# Patient Record
Sex: FEMALE | Race: WHITE
[De-identification: names, ages, dates, MRNs, and addresses within clinical notes are randomized per-mention and may not be internally consistent; named-entity substitution may affect disease eponyms.]

---

## 2018-06-11 ENCOUNTER — HOSPITAL ENCOUNTER (EMERGENCY)
Dept: HOSPITAL 44 - ED | Age: 53
Discharge: HOME | End: 2018-06-11
Payer: COMMERCIAL

## 2018-06-11 VITALS — SYSTOLIC BLOOD PRESSURE: 122 MMHG | DIASTOLIC BLOOD PRESSURE: 74 MMHG

## 2018-06-11 DIAGNOSIS — N13.2: Primary | ICD-10-CM

## 2018-06-11 LAB
AMORPHOUS SEDIMENT,UR: (no result)
APPEARANCE UR: (no result)
BASOPHILS NFR BLD: 0.7 % (ref 0–1.5)
COLOR,URINE: YELLOW
EGFR (NON-AFRICAN): > 60
EOSINOPHIL NFR BLD: 5.4 % (ref 0–6.8)
MCH RBC QN AUTO: 28.9 PG (ref 28–34)
MCV RBC AUTO: 89.7 FL (ref 80–100)
MONOCYTES %: 4.8 % (ref 0–11)
NEUTROPHILS #: 4.2 # K/UL (ref 1.4–7.7)
PH UR STRIP: 7 [PH] (ref 5–8)
RBC UR QL: (no result)
UROBILINOGEN URINE: 0.2 EU (ref 0.2–1)

## 2018-06-11 PROCEDURE — 80053 COMPREHEN METABOLIC PANEL: CPT

## 2018-06-11 PROCEDURE — 99285 EMERGENCY DEPT VISIT HI MDM: CPT

## 2018-06-11 PROCEDURE — 81002 URINALYSIS NONAUTO W/O SCOPE: CPT

## 2018-06-11 PROCEDURE — 74176 CT ABD & PELVIS W/O CONTRAST: CPT

## 2018-06-11 PROCEDURE — 96375 TX/PRO/DX INJ NEW DRUG ADDON: CPT

## 2018-06-11 PROCEDURE — S1016 NON-PVC INTRAVENOUS ADMINIST: HCPCS

## 2018-06-11 PROCEDURE — 96365 THER/PROPH/DIAG IV INF INIT: CPT

## 2018-06-11 PROCEDURE — 85025 COMPLETE CBC W/AUTO DIFF WBC: CPT

## 2018-06-11 RX ADMIN — ONDANSETRON ONE MG: 2 INJECTION INTRAMUSCULAR; INTRAVENOUS at 18:00

## 2018-06-11 RX ADMIN — KETOROLAC TROMETHAMINE ONE: 30 INJECTION, SOLUTION INTRAMUSCULAR at 18:11

## 2018-06-11 RX ADMIN — RETINOL, ERGOCALCIFEROL, .ALPHA.-TOCOPHEROL ACETATE, DL-, PHYTONADIONE, ASCORBIC ACID, NIACINAMIDE, RIBOFLAVIN 5-PHOSPHATE SODIUM, THIAMINE HYDROCHLORIDE, PYRIDOXINE HYDROCHLORIDE, DEXPANTHENOL, BIOTIN, FOLIC ACID, AND CYANOCOBALAMIN ONE MLS/HR: KIT at 18:03

## 2018-06-11 RX ADMIN — KETOROLAC TROMETHAMINE ONE MG: 30 INJECTION, SOLUTION INTRAMUSCULAR at 18:19

## 2018-06-11 NOTE — ED PHYSICIAN DOCUMENTATION
Female Urogenital Problems





- HISTORIAN


Historian: patient





- HPI


Stated Complaint: right abd/right flank pain


Chief Complaint: Female Urogenital Problems


Further Comments: yes (53 year old female patient presents with complaint of 

right sided abdominal pain that radiates to right groin. Pt stated pain started 

approximately 90 minutes PTA. Pt denies injury. Complains of nausea.)





- Vaginal Bleeding


Contraceptive: none





- Associated Symptoms


Urinary Symptoms: discomfort w/ urination, pain w/ urination





- ROS


CONST: none


GI/: denies: nausea, vomiting, decreased appetite, diarrhea, black stools, 

bloody stools, other


CVS/RESP: none


EYES/ENT: none


NEURO/PSYCH: none


MS/SKIN/LYMPH: none





- PAST HX


Past History: denies: none


Other History: other (anxiety, chronic pain, fibromyalgia, HTN, migraine, OA, 

neuropathy)


Allergies/Adverse Reactions: 


 Allergies











Allergy/AdvReac Type Severity Reaction Status Date / Time


 


No Known Allergies Allergy   Verified 06/11/18 17:57














Home Medications: 


 Ambulatory Orders











 Medication  Instructions  Recorded


 


Ketorolac Tromethamine [Toradol] 10 mg PO TID #15 tablet 06/11/18


 


Tamsulosin HCl [Flomax] 0.4 mg PO [id number] #7 cap.er.24h 06/11/18














- SOCIAL HX


Smoking History: cigarettes





- FAMILY HX


Family History: denies: none





- VITAL SIGNS


Vital Signs: 





 Vital Signs











Temp Pulse Resp BP Pulse Ox


 


 97.8 F   74   16   162/84   99 


 


 06/11/18 17:30  06/11/18 17:30  06/11/18 17:30  06/11/18 17:30  06/11/18 17:30














- REVIEWED ASSESSMENTS


Nursing Assessment  Reviewed: Yes


Vitals Reviewed: Yes





Progress





- Progress


Progress: 





Pain improved after Fluids and toradol.  





Reviewed CT results with patient; pain likely related to passed stone or 2mm 

stone at [facility]. 





Will start on Flomax; reviewed discharge instructions - verbalized 

understanding. 





ED Results Lab/Radiology





- Lab Results


Lab Results: 





 Lab Results











  06/11/18 06/11/18 06/11/18





  18:15 18:15 17:52


 


WBC    5.99 K/ul K/ul  





    (4.00-12.00)  


 


RBC    4.48 M/ul M/ul  





    (3.90-5.20)  


 


Hgb    12.9 g/dL g/dL  





    (12.0-16.0)  


 


Hct    40.2 % %  





    (34.5-46.5)  


 


MCV    89.7 fl fl  





    (80.0-100.0)  


 


MCH    28.9 pg pg  





    (28.0-34.0)  


 


MCHC    32.2 g/dL g/dL  





    (30.0-36.0)  


 


RDW    13.4 % %  





    (11.3-14.3)  


 


Plt Count    219 K/mm3 K/mm3  





    (130-400)  


 


Neut % (Auto)    70.5 % %  





    (39.0-79.0)  


 


Lymph % (Auto)    17.3 % %  





    (16.0-50.0)  


 


Mono % (Auto)    4.8 % %  





    (0.0-11.0)  


 


Eos % (Auto)    5.4 % %  





    (0.0-6.8)  


 


Baso % (Auto)    0.7   





    (0.0-1.5)  


 


Neut # (Auto)    4.2 # k/uL # k/uL  





    (1.4-7.7)  


 


Lymph # (Auto)    1.0 # k/uL # k/uL  





    (0.6-4.0)  


 


Mono # (Auto)    0.3 # k/uL # k/uL  





    (0.0-0.9)  


 


Eos # (Auto)    0.3 # k/uL # k/uL  





    (0.0-0.6)  


 


Baso # (Auto)    0.0 # k/uL # k/uL  





    (0.0-0.5)  


 


Reactive Lymphs %    1.4 % %  





    (0.0-5.0)  


 


Reactive Lymphs #    0.1 # k/uL # k/uL  





    (0.0-0.8)  


 


Sodium  143 mmol/L mmol/L    





   (136-145)   


 


Potassium  3.6 mmol/L mmol/L    





   (3.5-5.1)   


 


Chloride  110 mmol/L H mmol/L    





   ()   


 


Carbon Dioxide  26 mmol/L mmol/L    





   (22-30)   


 


BUN  17 mg/dL mg/dL    





   (7-17)   


 


Creatinine  0.90 mg/dL mg/dL    





   (0.52-1.04)   


 


Estimated Creat Clear  89     





    


 


Est GFR ( Amer)  > 60     





  (60 - )  


 


Est GFR (Non-Af Amer)  > 60     





  (60 - )  


 


Glucose  111 mg/dL H mg/dL    





   ()   


 


Calcium  8.8 mg/dL mg/dL    





   (8.4-10.2)   


 


Total Bilirubin  < 0.1 mg/dL L mg/dL    





   (0.2-1.3)   


 


AST  19 U/L U/L    





   (15-46)   


 


ALT  21 U/L U/L    





   (13-69)   


 


Alkaline Phosphatase  82 U/L U/L    





   ()   


 


Total Protein  7.1 g/dL g/dL    





   (6.3-8.2)   


 


Albumin  4.2 g/dL g/dL    





   (3.5-5.0)   


 


Urine Color      Yellow 





     (YELLOW) 


 


Urine Appearance      Cloudy  H 





     (CLEAR) 


 


Urine pH      7.0 





     (5.0 - 8.0) 


 


Ur Specific Gravity      1.020 





     (1.010-1.030) 


 


Urine Protein      Negative mg/dL mg/dL





     (NEGATIVE) 


 


Urine Ketones      Negative mg/dL mg/dL





     (NEGATIVE) 


 


Urine Occult Blood      1+  H 





     (NEGATIVE) 


 


Urine Nitrite      Negative 





     (NEGATIVE) 


 


Urine Bilirubin      Negative 





     (NEGATIVE) 


 


Urine Urobilinogen      0.2 Eu Eu





     (0.2-1.0) 


 


Ur Leukocyte Esterase      Negative 





     (NEGATIVE) 


 


Urine RBC      0-2 





     (0-2 HPF) 


 


Urine WBC      0-2 





     (0-5 HPF) 


 


Ur Squamous Epith Cells      Few 





     (NEG-FEW) 


 


Amorphous Sediment      Moderate  H 





     (NEGATIVE) 


 


Urine Glucose      Negative mg/dL mg/dL





     (NEGATIVE) 














- Radiology


Radiology Impressions: 





CT abdomen and pelvis without contrast 





History: Hematuria, right flank pain 





Technique: Images through the abdomen and pelvis were obtained without 

contrast. 





Findings: The lung bases, liver, gallbladder, spleen, pancreas, left kidney and 

bilateral adrenal glands are normal. There is no hydronephrosis, hydroureter or 

renal calculus on the left. There is a 2 mm nonobstructing right renal 

calculus. There is significant right hydronephrosis and hydroureter. Right 

hydronephrosis and hydroureter are of unclear etiology. There are multiple 

phleboliths in the pelvis. There may be a 2 mm distal ureteral calculus. There 

is no free air or fluid. There is no bowel obstruction. The appendix is normal. 

Uterus is grossly normal. 





Impression:  Right hydronephrosis and hydroureter, of unclear etiology.  2 mm 

nonobstructing right renal calculus.


 


Electronically signed on Jun 11, 2018 6:51:22 PM CDT by:


Juan Daniel Freedman





- Orders


Orders: 





 ED Orders











 Category Date Time Status


 


 Place IV Lock 1T Care  06/11/18 17:52 Active


 


 CT ABD & PELVIS W/O CON Stat Exams  06/11/18 Taken


 


 CBC/PLATELET/DIFF Stat Lab  06/11/18 18:15 Completed


 


 CMP Stat Lab  06/11/18 18:15 Completed


 


 UA W/MICRO IF INDICATED Stat Lab  06/11/18 17:52 Completed


 


 0.9 % Sodium Chloride [Normal Saline] 1,000 ml Med  06/11/18 17:52 Discontinued





 IV NOW   


 


 Ketorolac Tromethamine [Toradol] Med  06/11/18 18:05 Discontinued





 30 mg .ROUTE .STK-MED ONE   


 


 Ketorolac Tromethamine [Toradol] Med  06/11/18 18:04 Discontinued





 30 mg IVP NOW ONE   


 


 Ondansetron HCl/Pf [Zofran 4 mg/2 ml] Med  06/11/18 17:52 Discontinued





 4 mg IVP NOW ONE   














Female Urogenital Problems





- EXAM


General Appearance: moderate distress


EENT: eye inspection normal, ENT inspection normal, pharynx normal, no signs of 

dehydration, NIKO, no nystagmus, TM's nml


Respiratory: no resp. distress, breath sounds nml


CVS: reg rate & rhythm, heart sounds normal, equal pulses, no murmur, no gallop

, PMI nml, no JVD, no friction rub, 24


Abdomen: soft, non-tender, no organomegaly, no distention, nml bowel sounds


Back: painless ROM, CVA tenderness (right)


Skin: color nml, no rash, warm,dry


Extremities: non-tender, normal range of motion, no evidence of injury, no edema

, J, NP


Neuro: oriented X3, CN's nml as tested, motor nml, sensation nml, mood/affect 

nml





Discharge


Clincal Impression: 


 Renal calculus, right





Prescriptions: 


Ketorolac Tromethamine [Toradol] 10 mg PO TID #15 tablet


Tamsulosin HCl [Flomax] 0.4 mg PO [id number] #7 cap.er.24h


Referrals: 


Isaac Byrd MD [Primary Care Provider] - 2 Days


Additional Instructions: 


 your prescription and start it TOMORROW. 





Increase your fluid intake to at least 64 oz of water a day





Strain all urine.





If the stone is passed, place it in a specimen cup and take it to your primary 

care physician for analysis.





You may take tylenol or ibuprofen as needed for discomfort.


Condition: Stable


Disposition: 01 HOME, SELF-CARE


Decision to Admit: NO


Decision Time: 19:03

## 2018-06-12 NOTE — DIAGNOSTIC IMAGING REPORT
MARY GEIGER (NP) - ER 

[facility]

[id number] [address] [facility] [address]

[place], [place]. [address]

 

 

 

 

Report Submission Date: 2018 6:51:22 PM CDT

Patient       Study

Name:   BREONNA GHOSH       Date:   2018 6:24:04 PM CDT

MRN:   [id number]       Modality Type:   CT

Gender:   F       Description:   CT ABD PELVIS W/O CO

:   61       Institution:   [facility]

Physician:   MARY GEIGER (NP) - ER

     Accession:    [id number]

 

 

CT abdomen and pelvis without contrast 



History: Hematuria, right flank pain 



Technique: Images through the abdomen and pelvis were obtained without 
contrast. 



Findings: The lung bases, liver, gallbladder, spleen, pancreas, left kidney and 
bilateral adrenal glands are normal. There is no hydronephrosis, hydroureter or 
renal calculus on the left. There is a 2 mm nonobstructing right renal 
calculus. There is significant right hydronephrosis and hydroureter. Right 
hydronephrosis and hydroureter are of unclear etiology. There are multiple 
phleboliths in the pelvis. There may be a 2 mm distal ureteral calculus. There 
is no free air or fluid. There is no bowel obstruction. The appendix is normal. 
Uterus is grossly normal. 



Impression:  Right hydronephrosis and hydroureter, of unclear etiology.  2 mm 
nonobstructing right renal calculus.

 

Electronically signed on 2018 6:51:22 PM CDT by:

Juan Daniel RODRIGUEZ